# Patient Record
Sex: FEMALE | Race: WHITE | Employment: OTHER | URBAN - METROPOLITAN AREA
[De-identification: names, ages, dates, MRNs, and addresses within clinical notes are randomized per-mention and may not be internally consistent; named-entity substitution may affect disease eponyms.]

---

## 2019-02-28 ENCOUNTER — HOSPITAL ENCOUNTER (OUTPATIENT)
Dept: GENERAL RADIOLOGY | Age: 59
Discharge: HOME | End: 2019-02-28
Payer: COMMERCIAL

## 2019-02-28 DIAGNOSIS — M79.642 HAND PAIN, LEFT: ICD-10-CM

## 2019-02-28 PROCEDURE — 73120 X-RAY EXAM OF HAND: CPT

## 2022-08-03 DIAGNOSIS — N64.89 BREAST ASYMMETRY: Primary | ICD-10-CM

## 2022-08-03 NOTE — PROGRESS NOTES
Order for left breast diagnostic mammogram and left breast U/S for asymmetry faxed to [facility].

## 2022-08-08 DIAGNOSIS — N64.89 BREAST ASYMMETRY: ICD-10-CM

## 2023-01-17 ENCOUNTER — HOSPITAL ENCOUNTER (OUTPATIENT)
Dept: GENERAL RADIOLOGY | Age: 63
Discharge: HOME | End: 2023-01-20
Payer: COMMERCIAL

## 2023-01-17 DIAGNOSIS — M25.552 PAIN OF LEFT HIP JOINT: ICD-10-CM

## 2023-01-17 PROCEDURE — 73502 X-RAY EXAM HIP UNI 2-3 VIEWS: CPT

## 2023-02-06 DIAGNOSIS — N63.0 BREAST NODULE: ICD-10-CM

## 2023-02-06 DIAGNOSIS — N64.89 BREAST ASYMMETRY: Primary | ICD-10-CM

## 2023-02-06 DIAGNOSIS — R92.2 BREAST DENSITY: ICD-10-CM

## 2023-02-06 NOTE — PROGRESS NOTES
Orders for 6 month follow up diagnostic left breast mammogram and U/S faxed to [facility].

## 2023-07-31 ENCOUNTER — OFFICE VISIT (OUTPATIENT)
Dept: OBGYN CLINIC | Age: 63
End: 2023-07-31
Payer: COMMERCIAL

## 2023-07-31 VITALS
WEIGHT: 174 LBS | HEIGHT: 66 IN | BODY MASS INDEX: 27.97 KG/M2 | SYSTOLIC BLOOD PRESSURE: 122 MMHG | DIASTOLIC BLOOD PRESSURE: 78 MMHG

## 2023-07-31 DIAGNOSIS — Z12.4 CERVICAL CANCER SCREENING: ICD-10-CM

## 2023-07-31 DIAGNOSIS — Z91.89 DES EXPOSURE IN UTERO: ICD-10-CM

## 2023-07-31 DIAGNOSIS — N95.2 VAGINAL ATROPHY: ICD-10-CM

## 2023-07-31 DIAGNOSIS — Z01.419 ENCOUNTER FOR WELL WOMAN EXAM WITH ROUTINE GYNECOLOGICAL EXAM: Primary | ICD-10-CM

## 2023-07-31 PROCEDURE — 99396 PREV VISIT EST AGE 40-64: CPT | Performed by: OBSTETRICS & GYNECOLOGY

## 2023-07-31 RX ORDER — INSULIN DEGLUDEC INJECTION 100 U/ML
INJECTION, SOLUTION SUBCUTANEOUS
COMMUNITY
Start: 2022-02-25

## 2023-07-31 RX ORDER — TIRZEPATIDE 2.5 MG/.5ML
INJECTION, SOLUTION SUBCUTANEOUS
COMMUNITY
Start: 2023-06-15

## 2023-07-31 RX ORDER — ESTRADIOL 0.1 MG/G
1 CREAM VAGINAL
Qty: 42.5 G | Refills: 5 | Status: SHIPPED | OUTPATIENT
Start: 2023-07-31

## 2023-07-31 RX ORDER — ESTRADIOL 10 UG/1
INSERT VAGINAL
Qty: 24 TABLET | Refills: 4 | Status: SHIPPED | OUTPATIENT
Start: 2023-07-31

## 2023-07-31 RX ORDER — FAMOTIDINE 40 MG/1
40 TABLET, FILM COATED ORAL
COMMUNITY
Start: 2023-06-05

## 2023-07-31 NOTE — PROGRESS NOTES
HPI    Henrietta Apley is a 61 y.o. female seen for annual GYN exam.  She wants to try vaginal estrogen for vagina dryness    Past Medical History, Past Surgical History, Family history, Social History, and Medications were all reviewed with the patient today and updated as necessary. Current Outpatient Medications   Medication Sig    famotidine (PEPCID) 40 MG tablet Take 1 tablet by mouth    Insulin Degludec (TRESIBA FLEXTOUCH) 100 UNIT/ML SOPN     MOUNJARO 2.5 MG/0.5ML SOPN SC injection INJECT 2.5 MG UNDER THE SKIN EVERY 7 DAYS AFTER 4 WEEKS INCREASE TO 5MG. Estradiol (VAGIFEM) 10 MCG TABS vaginal tablet Use 2x weekly in the vagina at bedtime    estradiol (ESTRACE VAGINAL) 0.1 MG/GM vaginal cream Place 1 g vaginally Twice a Week    atorvastatin (LIPITOR) 10 MG tablet Take by mouth daily    azelastine (ASTELIN) 0.1 % nasal spray 2 sprays by Nasal route    benazepril (LOTENSIN) 10 MG tablet Take by mouth daily    etanercept (ENBREL) 50 MG/ML injection Inject into the skin    folic acid (FOLVITE) 1 MG tablet Take 1 tablet by mouth    glucagon 1 MG injection Inject 1 mg into the muscle    Insulin Aspart, w/Niacinamide, (FIASP FLEXTOUCH) 100 UNIT/ML SOPN INJECT 20-40 UNITS UNDER THE SKIN 3 (THREE) TIMES A DAY AROUND MEALS    methotrexate (RHEUMATREX) 2.5 MG chemo tablet TAKE 8 TABLETS ONCE A WEEK (4 IN AM, 4 IN PM) ORALLY 90 DAYS    triamcinolone (KENALOG) 0.1 % cream Apply topically 3 (three) times a day. No current facility-administered medications for this visit.      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

## 2023-08-08 LAB
CYTOLOGIST CVX/VAG CYTO: NORMAL
CYTOLOGY CVX/VAG DOC THIN PREP: NORMAL
HPV REFLEX: NORMAL
Lab: NORMAL
PATH REPORT.FINAL DX SPEC: NORMAL
PATHOLOGIST CVX/VAG CYTO: NORMAL
STAT OF ADQ CVX/VAG CYTO-IMP: NORMAL